# Patient Record
Sex: MALE | Race: OTHER | URBAN - METROPOLITAN AREA
[De-identification: names, ages, dates, MRNs, and addresses within clinical notes are randomized per-mention and may not be internally consistent; named-entity substitution may affect disease eponyms.]

---

## 2023-11-28 ENCOUNTER — OFFICE VISIT (OUTPATIENT)
Age: 48
End: 2023-11-28
Payer: COMMERCIAL

## 2023-11-28 VITALS
SYSTOLIC BLOOD PRESSURE: 142 MMHG | HEART RATE: 76 BPM | DIASTOLIC BLOOD PRESSURE: 96 MMHG | HEIGHT: 72 IN | RESPIRATION RATE: 18 BRPM | WEIGHT: 175 LBS | BODY MASS INDEX: 23.7 KG/M2 | TEMPERATURE: 97.3 F | OXYGEN SATURATION: 97 %

## 2023-11-28 DIAGNOSIS — Z11.4 SCREENING FOR HIV WITHOUT PRESENCE OF RISK FACTORS: ICD-10-CM

## 2023-11-28 DIAGNOSIS — M54.16 LUMBAR RADICULOPATHY, CHRONIC: ICD-10-CM

## 2023-11-28 DIAGNOSIS — M53.9 MULTILEVEL DEGENERATIVE DISC DISEASE: ICD-10-CM

## 2023-11-28 DIAGNOSIS — M1A.09X0 IDIOPATHIC CHRONIC GOUT OF MULTIPLE SITES WITHOUT TOPHUS: ICD-10-CM

## 2023-11-28 DIAGNOSIS — E78.2 MIXED HYPERLIPIDEMIA: ICD-10-CM

## 2023-11-28 DIAGNOSIS — I10 PRIMARY HYPERTENSION: ICD-10-CM

## 2023-11-28 DIAGNOSIS — Z11.59 NEED FOR HEPATITIS C SCREENING TEST: ICD-10-CM

## 2023-11-28 DIAGNOSIS — R39.198 DIFFICULTY URINATING: ICD-10-CM

## 2023-11-28 DIAGNOSIS — Z12.11 SCREENING FOR COLORECTAL CANCER: ICD-10-CM

## 2023-11-28 DIAGNOSIS — M54.12 CERVICAL RADICULOPATHY: Primary | ICD-10-CM

## 2023-11-28 DIAGNOSIS — R35.1 NOCTURIA: ICD-10-CM

## 2023-11-28 DIAGNOSIS — Z12.12 SCREENING FOR COLORECTAL CANCER: ICD-10-CM

## 2023-11-28 LAB
ABSOLUTE IMMATURE GRANULOCYTE: <0.03 K/UL (ref 0–0.58)
ALBUMIN SERPL-MCNC: 4.6 G/DL (ref 3.5–5.2)
ALP BLD-CCNC: 81 U/L (ref 40–129)
ALT SERPL-CCNC: 42 U/L (ref 0–40)
ANION GAP SERPL CALCULATED.3IONS-SCNC: 13 MMOL/L (ref 7–16)
AST SERPL-CCNC: 28 U/L (ref 0–39)
BASOPHILS ABSOLUTE: 0.03 K/UL (ref 0–0.2)
BASOPHILS RELATIVE PERCENT: 1 % (ref 0–2)
BILIRUB SERPL-MCNC: 0.7 MG/DL (ref 0–1.2)
BILIRUBIN, POC: NEGATIVE
BLOOD URINE, POC: NEGATIVE
BUN BLDV-MCNC: 15 MG/DL (ref 6–20)
CALCIUM SERPL-MCNC: 9.4 MG/DL (ref 8.6–10.2)
CHLORIDE BLD-SCNC: 104 MMOL/L (ref 98–107)
CHOLESTEROL: 124 MG/DL
CLARITY, POC: NORMAL
CO2: 27 MMOL/L (ref 22–29)
COLOR, POC: YELLOW
CREAT SERPL-MCNC: 1 MG/DL (ref 0.7–1.2)
EOSINOPHILS ABSOLUTE: 0.27 K/UL (ref 0.05–0.5)
EOSINOPHILS RELATIVE PERCENT: 4 % (ref 0–6)
GFR SERPL CREATININE-BSD FRML MDRD: >60 ML/MIN/1.73M2
GLUCOSE BLD-MCNC: 133 MG/DL (ref 74–99)
GLUCOSE URINE, POC: NEGATIVE
HCT VFR BLD CALC: 44.9 % (ref 37–54)
HDLC SERPL-MCNC: 32 MG/DL
HEMOGLOBIN: 14.7 G/DL (ref 12.5–16.5)
IMMATURE GRANULOCYTES: 0 % (ref 0–5)
KETONES, POC: NEGATIVE
LDL CHOLESTEROL: 68 MG/DL
LEUKOCYTE EST, POC: NEGATIVE
LYMPHOCYTES ABSOLUTE: 2.21 K/UL (ref 1.5–4)
LYMPHOCYTES RELATIVE PERCENT: 34 % (ref 20–42)
MCH RBC QN AUTO: 29.5 PG (ref 26–35)
MCHC RBC AUTO-ENTMCNC: 32.7 G/DL (ref 32–34.5)
MCV RBC AUTO: 90 FL (ref 80–99.9)
MONOCYTES ABSOLUTE: 0.44 K/UL (ref 0.1–0.95)
MONOCYTES RELATIVE PERCENT: 7 % (ref 2–12)
NEUTROPHILS ABSOLUTE: 3.59 K/UL (ref 1.8–7.3)
NEUTROPHILS RELATIVE PERCENT: 55 % (ref 43–80)
NITRITE, POC: NEGATIVE
PDW BLD-RTO: 12.3 % (ref 11.5–15)
PH, POC: 5.5
PLATELET # BLD: 210 K/UL (ref 130–450)
PMV BLD AUTO: 11.2 FL (ref 7–12)
POTASSIUM SERPL-SCNC: 3.5 MMOL/L (ref 3.5–5)
PROSTATE SPECIFIC ANTIGEN: 1.42 NG/ML (ref 0–4)
PROTEIN, POC: NEGATIVE
RBC # BLD: 4.99 M/UL (ref 3.8–5.8)
SODIUM BLD-SCNC: 144 MMOL/L (ref 132–146)
SPECIFIC GRAVITY, POC: 1.03
TOTAL PROTEIN: 7.7 G/DL (ref 6.4–8.3)
TRIGL SERPL-MCNC: 118 MG/DL
UROBILINOGEN, POC: 0.2
VLDLC SERPL CALC-MCNC: 24 MG/DL
WBC # BLD: 6.6 K/UL (ref 4.5–11.5)

## 2023-11-28 PROCEDURE — 3077F SYST BP >= 140 MM HG: CPT | Performed by: FAMILY MEDICINE

## 2023-11-28 PROCEDURE — 3080F DIAST BP >= 90 MM HG: CPT | Performed by: FAMILY MEDICINE

## 2023-11-28 PROCEDURE — 81002 URINALYSIS NONAUTO W/O SCOPE: CPT | Performed by: FAMILY MEDICINE

## 2023-11-28 PROCEDURE — 99204 OFFICE O/P NEW MOD 45 MIN: CPT | Performed by: FAMILY MEDICINE

## 2023-11-28 PROCEDURE — 36415 COLL VENOUS BLD VENIPUNCTURE: CPT | Performed by: FAMILY MEDICINE

## 2023-11-28 RX ORDER — AMLODIPINE BESYLATE 10 MG/1
10 TABLET ORAL DAILY
COMMUNITY
End: 2023-11-28 | Stop reason: SDUPTHER

## 2023-11-28 RX ORDER — ALLOPURINOL 300 MG/1
300 TABLET ORAL DAILY
COMMUNITY

## 2023-11-28 RX ORDER — LISINOPRIL AND HYDROCHLOROTHIAZIDE 25; 20 MG/1; MG/1
1 TABLET ORAL DAILY
COMMUNITY
End: 2023-11-28 | Stop reason: SDUPTHER

## 2023-11-28 RX ORDER — AMLODIPINE BESYLATE 10 MG/1
10 TABLET ORAL DAILY
Qty: 90 TABLET | Refills: 1 | Status: SHIPPED | OUTPATIENT
Start: 2023-11-28

## 2023-11-28 RX ORDER — LISINOPRIL 20 MG/1
20 TABLET ORAL DAILY
COMMUNITY
End: 2023-11-28

## 2023-11-28 RX ORDER — LISINOPRIL AND HYDROCHLOROTHIAZIDE 25; 20 MG/1; MG/1
1 TABLET ORAL DAILY
Qty: 90 TABLET | Refills: 1 | Status: SHIPPED | OUTPATIENT
Start: 2023-11-28

## 2023-11-28 SDOH — ECONOMIC STABILITY: FOOD INSECURITY: WITHIN THE PAST 12 MONTHS, YOU WORRIED THAT YOUR FOOD WOULD RUN OUT BEFORE YOU GOT MONEY TO BUY MORE.: NEVER TRUE

## 2023-11-28 SDOH — ECONOMIC STABILITY: FOOD INSECURITY: WITHIN THE PAST 12 MONTHS, THE FOOD YOU BOUGHT JUST DIDN'T LAST AND YOU DIDN'T HAVE MONEY TO GET MORE.: NEVER TRUE

## 2023-11-28 SDOH — ECONOMIC STABILITY: HOUSING INSECURITY
IN THE LAST 12 MONTHS, WAS THERE A TIME WHEN YOU DID NOT HAVE A STEADY PLACE TO SLEEP OR SLEPT IN A SHELTER (INCLUDING NOW)?: NO

## 2023-11-28 SDOH — ECONOMIC STABILITY: INCOME INSECURITY: HOW HARD IS IT FOR YOU TO PAY FOR THE VERY BASICS LIKE FOOD, HOUSING, MEDICAL CARE, AND HEATING?: NOT VERY HARD

## 2023-11-28 ASSESSMENT — ENCOUNTER SYMPTOMS
NAUSEA: 0
ABDOMINAL PAIN: 0
DIARRHEA: 0
CONSTIPATION: 0
COUGH: 0
VOMITING: 0
SHORTNESS OF BREATH: 0
WHEEZING: 0

## 2023-11-28 ASSESSMENT — PATIENT HEALTH QUESTIONNAIRE - PHQ9
SUM OF ALL RESPONSES TO PHQ QUESTIONS 1-9: 0
2. FEELING DOWN, DEPRESSED OR HOPELESS: 0
SUM OF ALL RESPONSES TO PHQ QUESTIONS 1-9: 0
SUM OF ALL RESPONSES TO PHQ9 QUESTIONS 1 & 2: 0
SUM OF ALL RESPONSES TO PHQ QUESTIONS 1-9: 0
SUM OF ALL RESPONSES TO PHQ QUESTIONS 1-9: 0
1. LITTLE INTEREST OR PLEASURE IN DOING THINGS: 0

## 2023-11-28 NOTE — PROGRESS NOTES
regarding above diagnosis, including possible risks and complications,  especially if left uncontrolled. Counseled regarding the possible side effects, risks, benefits and alternatives to treatment; patient and/or guardian verbalizes understanding, agrees, feels comfortable with and wishes to proceed with above treatment plan. Advised patient to call with any new medication issues, and read all Rx info from pharmacy to assure aware of all possible risks and side effects of medication before taking. Reviewed age and gender appropriate health screening exams and vaccinations. Advised patient regarding importance of keeping up with recommended health maintenance and to schedule as soon as possible if overdue, as this is important in assessing for undiagnosed pathology, especially cancer, as well as protecting against potentially harmful/life threatening disease. Patient and/or guardian verbalizes understanding and agrees with above counseling, assessment and plan. All questions answered    Additional plan and future considerations:   RTO in 6mos    Return to Office: Return in about 6 months (around 5/28/2024).     Electronically signed by Mio Wilkins MD on 11/28/2023 at 11:44 AM

## 2023-11-29 LAB
HEPATITIS C ANTIBODY: NONREACTIVE
HIV AG/AB: NONREACTIVE

## 2024-01-16 ENCOUNTER — PREP FOR PROCEDURE (OUTPATIENT)
Dept: SURGERY | Age: 49
End: 2024-01-16

## 2024-01-16 ENCOUNTER — OFFICE VISIT (OUTPATIENT)
Dept: SURGERY | Age: 49
End: 2024-01-16
Payer: COMMERCIAL

## 2024-01-16 ENCOUNTER — TELEPHONE (OUTPATIENT)
Dept: SURGERY | Age: 49
End: 2024-01-16

## 2024-01-16 VITALS
RESPIRATION RATE: 16 BRPM | OXYGEN SATURATION: 96 % | WEIGHT: 177 LBS | HEIGHT: 72 IN | TEMPERATURE: 97 F | HEART RATE: 71 BPM | DIASTOLIC BLOOD PRESSURE: 100 MMHG | BODY MASS INDEX: 23.98 KG/M2 | SYSTOLIC BLOOD PRESSURE: 150 MMHG

## 2024-01-16 DIAGNOSIS — Z12.11 SCREEN FOR COLON CANCER: ICD-10-CM

## 2024-01-16 DIAGNOSIS — Z12.11 COLON CANCER SCREENING: Primary | ICD-10-CM

## 2024-01-16 PROCEDURE — 99204 OFFICE O/P NEW MOD 45 MIN: CPT | Performed by: SURGERY

## 2024-01-16 PROCEDURE — 99202 OFFICE O/P NEW SF 15 MIN: CPT | Performed by: SURGERY

## 2024-01-16 RX ORDER — SODIUM CHLORIDE 0.9 % (FLUSH) 0.9 %
10 SYRINGE (ML) INJECTION PRN
OUTPATIENT
Start: 2024-01-16

## 2024-01-16 RX ORDER — SODIUM, POTASSIUM,MAG SULFATES 17.5-3.13G
2 SOLUTION, RECONSTITUTED, ORAL ORAL ONCE
Qty: 2 EACH | Refills: 0 | Status: SHIPPED | OUTPATIENT
Start: 2024-01-16 | End: 2024-01-16

## 2024-01-16 RX ORDER — SODIUM CHLORIDE 9 MG/ML
25 INJECTION, SOLUTION INTRAVENOUS PRN
OUTPATIENT
Start: 2024-01-16

## 2024-01-16 RX ORDER — SODIUM CHLORIDE 0.9 % (FLUSH) 0.9 %
10 SYRINGE (ML) INJECTION EVERY 12 HOURS SCHEDULED
OUTPATIENT
Start: 2024-01-16

## 2024-01-16 NOTE — H&P
CC:  Colorectal cancer screening/surveillance    Assessment:  Average risk for colorectal cancer      Plan:  Schedule for colonoscopy with possible biopsy, cauterization, or polypectomy     Visit conducted with video .   Wife accompanied him today.     HPI  Marty Pat presents for colorectal cancer screening.  The patient has never been screened..  The patient reports no abdominal pain, change in stool caliber, blood in stool, rectal bleeding, or weight loss.  There is no family history of colorectal cancer..    Past Medical History:   Diagnosis Date    Gout     Hypertension      Past Surgical History:   Procedure Laterality Date    EYE MUSCLE SURGERY  1983    Had multiple surgeries (5-6)- last 1983.     Family History   Problem Relation Age of Onset    High Cholesterol Mother     Heart Disease Mother     Diabetes Mother     Heart Disease Father     Diabetes Father     Cancer Father 77        Bladder Cancer- Stage 4    Diabetes Brother     Heart Disease Brother     High Cholesterol Brother     Stroke Brother     Diabetes Maternal Grandmother     Cancer Maternal Grandfather      Social History     Tobacco Use    Smoking status: Never    Smokeless tobacco: Never   Vaping Use    Vaping Use: Never used   Substance Use Topics    Alcohol use: Yes    Drug use: Not Currently     Types: Marijuana (Weed)     Prior to Admission medications    Medication Sig Start Date End Date Taking? Authorizing Provider   sodium-potassium-mag sulfate (SUPREP) 17.5-3.13-1.6 GM/177ML SOLN solution Take 2 Bottles by mouth once for 1 dose 1/16/24 1/16/24 Yes Yony Perez MD   allopurinol (ZYLOPRIM) 300 MG tablet Take 1 tablet by mouth daily    ProviderChidi MD   amLODIPine (NORVASC) 10 MG tablet Take 1 tablet by mouth daily 11/28/23   Kera Andrews MD   lisinopril-hydroCHLOROthiazide (PRINZIDE;ZESTORETIC) 20-25 MG per tablet Take 1 tablet by mouth daily 11/28/23   Kera Andrews MD     No Known  unable to communicate to the pt duo to sedation

## 2024-01-16 NOTE — TELEPHONE ENCOUNTER
Scheduled pt for Colonoscopy 24 at 1PM. Pt needs to arrive at [facility] at 12PM. Patient confirmed date and time, address and directions given in office. Prep instructions given in office, patient understood.      Prior Authorization Form:      DEMOGRAPHICS:                     Patient Name:  Juan Pat  Patient :  1975            Insurance:  Payor: [employer] / Plan: [facility] [employer] / Product Type: *No Product type* /   Insurance ID Number:    Payer/Plan Subscr  Sex Relation Sub. Ins. ID Effective Group Num   1. [facility]* JUAN PAT 1975 Male Self [id number]                                   [address]         DIAGNOSIS & PROCEDURE:                       Procedure/Operation: COLONOSCOPY           CPT Code: 55400    Diagnosis:  SCREENING    ICD10 Code: Z12.11    Location:  [facility]    Surgeon:  AYAKA MARQUEZ    SCHEDULING INFORMATION:                          Date: 24    Time: 1PM              Anesthesia:  LMAC                                                       Status:  OUTPATIENT       Special Comments:  N/A       Electronically signed by Laura Guerrero MA on 2024 at 9:58 AM

## 2024-01-16 NOTE — H&P (VIEW-ONLY)
CC:  Colorectal cancer screening/surveillance    Assessment:  Average risk for colorectal cancer      Plan:  Schedule for colonoscopy with possible biopsy, cauterization, or polypectomy     Visit conducted with video .   Wife accompanied him today.     HPI  Marty Pat presents for colorectal cancer screening.  The patient has never been screened..  The patient reports no abdominal pain, change in stool caliber, blood in stool, rectal bleeding, or weight loss.  There is no family history of colorectal cancer..    Past Medical History:   Diagnosis Date    Gout     Hypertension      Past Surgical History:   Procedure Laterality Date    EYE MUSCLE SURGERY  1983    Had multiple surgeries (5-6)- last 1983.     Family History   Problem Relation Age of Onset    High Cholesterol Mother     Heart Disease Mother     Diabetes Mother     Heart Disease Father     Diabetes Father     Cancer Father 77        Bladder Cancer- Stage 4    Diabetes Brother     Heart Disease Brother     High Cholesterol Brother     Stroke Brother     Diabetes Maternal Grandmother     Cancer Maternal Grandfather      Social History     Tobacco Use    Smoking status: Never    Smokeless tobacco: Never   Vaping Use    Vaping Use: Never used   Substance Use Topics    Alcohol use: Yes    Drug use: Not Currently     Types: Marijuana (Weed)     Prior to Admission medications    Medication Sig Start Date End Date Taking? Authorizing Provider   sodium-potassium-mag sulfate (SUPREP) 17.5-3.13-1.6 GM/177ML SOLN solution Take 2 Bottles by mouth once for 1 dose 1/16/24 1/16/24 Yes Yony Perez MD   allopurinol (ZYLOPRIM) 300 MG tablet Take 1 tablet by mouth daily    ProviderChidi MD   amLODIPine (NORVASC) 10 MG tablet Take 1 tablet by mouth daily 11/28/23   Kera Andrews MD   lisinopril-hydroCHLOROthiazide (PRINZIDE;ZESTORETIC) 20-25 MG per tablet Take 1 tablet by mouth daily 11/28/23   Kera Andrews MD     No Known

## 2024-01-25 NOTE — PROGRESS NOTES
[facility]   PRE-ADMISSION TESTING GENERAL INSTRUCTIONS  PAT Phone Number: [phone]      GENERAL INSTRUCTIONS:    [x] Antibacterial Soap Shower Night before and/or AM of surgery.    [x]Follow bowel prep instructions per surgeon.  No liquids/jello that are red or purple color.       [x] Do not wear makeup, lotions, powders, deodorant.   [x] Nothing by mouth after midnight; including gum, candy, mints, or water.  [x] You may brush your teeth, gargle, but do NOT swallow water.   [x] No tobacco products, illegal drugs, marijuana or alcohol within 24 hours of your surgery.  [x] Jewelry or valuables should not be brought to the hospital. All body and/or tongue piercing's must be removed prior to arriving to hospital. No contact lens or hair pins.   [x] Arrange transportation with a responsible adult  to and from the hospital. Arrange for someone to be with you for the remainder of the day and for 24 hours after your procedure due to having had anesthesia.          -Who will be your  for transportation?  wife        -Who will be staying with you for 24 hrs after your procedure?  wife  [x] Bring insurance card and photo ID.       PARKING INSTRUCTIONS:     [x] ARRIVAL DATE  1/31 & TIME   12:00 pm:   [x]Surgery time may change, if it does we will call you the business day before your scheduled surgery. Will call via [employer].  If you do not answer th phone the  will leave you a message.   [x] Enter into the [facility] Entrance. Two adults may accompany you.   [x] Parking lot '\"I\"  is located on [facility] (the corner of [address] and [address]).  To enter the lot,you will need to get a parking ticket at the gate .  To exit you will be given a free parking voucher.  You will need both the ticket and parking voucher to exit the parking lot. One car per patient is allowed to park in this lot.   Walk up the front walk to the [address] Entrance, the

## 2024-01-31 ENCOUNTER — ANESTHESIA EVENT (OUTPATIENT)
Dept: ENDOSCOPY | Age: 49
End: 2024-01-31
Payer: COMMERCIAL

## 2024-01-31 ENCOUNTER — HOSPITAL ENCOUNTER (OUTPATIENT)
Age: 49
Setting detail: OUTPATIENT SURGERY
Discharge: HOME | End: 2024-01-31
Attending: SURGERY | Admitting: SURGERY
Payer: COMMERCIAL

## 2024-01-31 ENCOUNTER — ANESTHESIA (OUTPATIENT)
Dept: ENDOSCOPY | Age: 49
End: 2024-01-31
Payer: COMMERCIAL

## 2024-01-31 VITALS
BODY MASS INDEX: 37.52 KG/M2 | HEART RATE: 76 BPM | DIASTOLIC BLOOD PRESSURE: 91 MMHG | TEMPERATURE: 97 F | SYSTOLIC BLOOD PRESSURE: 155 MMHG | WEIGHT: 277 LBS | OXYGEN SATURATION: 96 % | HEIGHT: 72 IN | RESPIRATION RATE: 16 BRPM

## 2024-01-31 PROBLEM — K57.30 SIGMOID DIVERTICULOSIS: Status: ACTIVE | Noted: 2024-01-31

## 2024-01-31 PROCEDURE — 2500000003 HC RX 250 WO HCPCS: Performed by: NURSE ANESTHETIST, CERTIFIED REGISTERED

## 2024-01-31 PROCEDURE — 2580000003 HC RX 258: Performed by: SURGERY

## 2024-01-31 PROCEDURE — 3700000001 HC ADD 15 MINUTES (ANESTHESIA): Performed by: SURGERY

## 2024-01-31 PROCEDURE — 45378 DIAGNOSTIC COLONOSCOPY: CPT | Performed by: SURGERY

## 2024-01-31 PROCEDURE — 3609027000 HC COLONOSCOPY: Performed by: SURGERY

## 2024-01-31 PROCEDURE — 2580000003 HC RX 258: Performed by: NURSE ANESTHETIST, CERTIFIED REGISTERED

## 2024-01-31 PROCEDURE — 6360000002 HC RX W HCPCS: Performed by: NURSE ANESTHETIST, CERTIFIED REGISTERED

## 2024-01-31 PROCEDURE — 7100000011 HC PHASE II RECOVERY - ADDTL 15 MIN: Performed by: SURGERY

## 2024-01-31 PROCEDURE — 2709999900 HC NON-CHARGEABLE SUPPLY: Performed by: SURGERY

## 2024-01-31 PROCEDURE — 7100000010 HC PHASE II RECOVERY - FIRST 15 MIN: Performed by: SURGERY

## 2024-01-31 PROCEDURE — 3700000000 HC ANESTHESIA ATTENDED CARE: Performed by: SURGERY

## 2024-01-31 RX ORDER — SODIUM CHLORIDE 0.9 % (FLUSH) 0.9 %
10 SYRINGE (ML) INJECTION PRN
Status: DISCONTINUED | OUTPATIENT
Start: 2024-01-31 | End: 2024-01-31 | Stop reason: HOSPADM

## 2024-01-31 RX ORDER — PROPOFOL 10 MG/ML
INJECTION, EMULSION INTRAVENOUS PRN
Status: DISCONTINUED | OUTPATIENT
Start: 2024-01-31 | End: 2024-01-31

## 2024-01-31 RX ORDER — PROPOFOL 10 MG/ML
INJECTION, EMULSION INTRAVENOUS CONTINUOUS PRN
Status: DISCONTINUED | OUTPATIENT
Start: 2024-01-31 | End: 2024-01-31 | Stop reason: SDUPTHER

## 2024-01-31 RX ORDER — SODIUM CHLORIDE 9 MG/ML
25 INJECTION, SOLUTION INTRAVENOUS PRN
Status: DISCONTINUED | OUTPATIENT
Start: 2024-01-31 | End: 2024-01-31 | Stop reason: HOSPADM

## 2024-01-31 RX ORDER — SODIUM CHLORIDE 9 MG/ML
INJECTION, SOLUTION INTRAVENOUS CONTINUOUS PRN
Status: DISCONTINUED | OUTPATIENT
Start: 2024-01-31 | End: 2024-01-31 | Stop reason: SDUPTHER

## 2024-01-31 RX ORDER — MIDAZOLAM HYDROCHLORIDE 1 MG/ML
INJECTION INTRAMUSCULAR; INTRAVENOUS PRN
Status: DISCONTINUED | OUTPATIENT
Start: 2024-01-31 | End: 2024-01-31 | Stop reason: SDUPTHER

## 2024-01-31 RX ORDER — SODIUM CHLORIDE 0.9 % (FLUSH) 0.9 %
10 SYRINGE (ML) INJECTION EVERY 12 HOURS SCHEDULED
Status: DISCONTINUED | OUTPATIENT
Start: 2024-01-31 | End: 2024-01-31 | Stop reason: HOSPADM

## 2024-01-31 RX ORDER — KETAMINE HCL IN NACL, ISO-OSM 100MG/10ML
SYRINGE (ML) INJECTION PRN
Status: DISCONTINUED | OUTPATIENT
Start: 2024-01-31 | End: 2024-01-31 | Stop reason: SDUPTHER

## 2024-01-31 RX ADMIN — SODIUM CHLORIDE 25 ML: 9 INJECTION, SOLUTION INTRAVENOUS at 10:46

## 2024-01-31 RX ADMIN — PROPOFOL 150 MCG/KG/MIN: 10 INJECTION, EMULSION INTRAVENOUS at 13:29

## 2024-01-31 RX ADMIN — MIDAZOLAM 2 MG: 1 INJECTION INTRAMUSCULAR; INTRAVENOUS at 13:29

## 2024-01-31 RX ADMIN — SODIUM CHLORIDE: 9 INJECTION, SOLUTION INTRAVENOUS at 12:57

## 2024-01-31 RX ADMIN — Medication 20 MG: at 13:34

## 2024-01-31 RX ADMIN — Medication 30 MG: at 13:29

## 2024-01-31 ASSESSMENT — ENCOUNTER SYMPTOMS: SHORTNESS OF BREATH: 0

## 2024-01-31 ASSESSMENT — PAIN - FUNCTIONAL ASSESSMENT: PAIN_FUNCTIONAL_ASSESSMENT: 0-10

## 2024-01-31 ASSESSMENT — LIFESTYLE VARIABLES: SMOKING_STATUS: 0

## 2024-01-31 NOTE — ANESTHESIA PRE PROCEDURE
kg (277 lb)   Height: 1.829 m (6') 1.829 m (6')                                              BP Readings from Last 3 Encounters:   01/31/24 (!) 167/95   01/16/24 (!) 150/100   11/28/23 (!) 142/96       NPO Status: Time of last liquid consumption: 2300                        Time of last solid consumption: 1500                        Date of last liquid consumption: 01/30/24                        Date of last solid food consumption: 01/30/24    BMI:   Wt Readings from Last 3 Encounters:   01/31/24 125.6 kg (277 lb)   01/16/24 80.3 kg (177 lb)   11/28/23 79.4 kg (175 lb)     Body mass index is 37.57 kg/m².    CBC:   Lab Results   Component Value Date/Time    WBC 6.6 11/28/2023 10:08 AM    RBC 4.99 11/28/2023 10:08 AM    HGB 14.7 11/28/2023 10:08 AM    HCT 44.9 11/28/2023 10:08 AM    MCV 90.0 11/28/2023 10:08 AM    RDW 12.3 11/28/2023 10:08 AM     11/28/2023 10:08 AM       CMP:   Lab Results   Component Value Date/Time     11/28/2023 10:08 AM    K 3.5 11/28/2023 10:08 AM     11/28/2023 10:08 AM    CO2 27 11/28/2023 10:08 AM    BUN 15 11/28/2023 10:08 AM    CREATININE 1.0 11/28/2023 10:08 AM    LABGLOM >60 11/28/2023 10:08 AM    GLUCOSE 133 11/28/2023 10:08 AM    PROT 7.7 11/28/2023 10:08 AM    CALCIUM 9.4 11/28/2023 10:08 AM    BILITOT 0.7 11/28/2023 10:08 AM    ALKPHOS 81 11/28/2023 10:08 AM    AST 28 11/28/2023 10:08 AM    ALT 42 11/28/2023 10:08 AM       POC Tests: No results for input(s): \"POCGLU\", \"POCNA\", \"POCK\", \"POCCL\", \"POCBUN\", \"POCHEMO\", \"POCHCT\" in the last 72 hours.    Coags: No results found for: \"PROTIME\", \"INR\", \"APTT\"    HCG (If Applicable): No results found for: \"PREGTESTUR\", \"PREGSERUM\", \"HCG\", \"HCGQUANT\"     ABGs: No results found for: \"PHART\", \"PO2ART\", \"RQE0OEX\", \"PBT2XVM\", \"BEART\", \"T2SFSNQP\"     Type & Screen (If Applicable):  No results found for: \"LABABO\", \"LABRH\"    Drug/Infectious Status (If Applicable):  Lab Results   Component Value Date/Time    HEPCAB NONREACTIVE

## 2024-01-31 NOTE — PROGRESS NOTES
Patient tolerating oral intake     Person waiting for patient at bedside    Discharge instructions provided to patient, written and verbal, with significant other at bedside, patient verbalized understanding.     Utilized  Juliette [id number]

## 2024-01-31 NOTE — INTERVAL H&P NOTE
Update History & Physical    The patient's History and Physical of January 16, 2024 was reviewed with the patient and I examined the patient. There was no change. The surgical site was confirmed by the patient and me.     Plan: The risks, benefits, expected outcome, and alternative to the recommended procedure have been discussed with the patient. Patient understands and wants to proceed with the procedure.     Electronically signed by Yony Perez MD on 1/31/2024 at 10:32 AM

## 2024-01-31 NOTE — PROGRESS NOTES
Patient to SRDA & placed on appropriate monitors.   Cart low, locked with siderails up. Call light within reach.   Dr. Bello bedside reviewed post op instructions with patient and wife via

## 2024-02-01 NOTE — ANESTHESIA POSTPROCEDURE EVALUATION
Department of Anesthesiology  Postprocedure Note    Patient: Marty Pat  MRN: [id number]  YOB: 1975  Date of evaluation: 2/1/2024    Procedure Summary       Date: 01/31/24 Room / Location: [place] [address] / [facility]    Anesthesia Start: 1257 Anesthesia Stop: 1353    Procedure: COLORECTAL CANCER SCREENING, NOT HIGH RISK Diagnosis:       Screen for colon cancer      (Screen for colon cancer [Z12.11])    Surgeons: Yony Perez MD Responsible Provider: Kimberlyn Long MD    Anesthesia Type: MAC ASA Status: 2            Anesthesia Type: No value filed.    Mariama Phase I: Mariama Score: 10    Mariama Phase II: Mariama Score: 10    Anesthesia Post Evaluation    Patient location during evaluation: PACU  Patient participation: complete - patient participated  Level of consciousness: awake and alert  Airway patency: patent  Nausea & Vomiting: no nausea and no vomiting  Cardiovascular status: blood pressure returned to baseline and hemodynamically stable  Respiratory status: acceptable and spontaneous ventilation  Hydration status: euvolemic  Multimodal analgesia pain management approach  Pain management: adequate    No notable events documented.

## 2024-02-15 PROBLEM — Z12.11 COLON CANCER SCREENING: Status: RESOLVED | Noted: 2024-01-16 | Resolved: 2024-02-15

## 2024-03-01 PROBLEM — Z12.11 SCREEN FOR COLON CANCER: Status: RESOLVED | Noted: 2024-01-16 | Resolved: 2024-03-01

## 2024-06-05 ENCOUNTER — OFFICE VISIT (OUTPATIENT)
Age: 49
End: 2024-06-05
Payer: COMMERCIAL

## 2024-06-05 VITALS
TEMPERATURE: 97.4 F | HEIGHT: 72 IN | RESPIRATION RATE: 18 BRPM | WEIGHT: 286.5 LBS | SYSTOLIC BLOOD PRESSURE: 122 MMHG | OXYGEN SATURATION: 96 % | BODY MASS INDEX: 38.8 KG/M2 | DIASTOLIC BLOOD PRESSURE: 70 MMHG | HEART RATE: 72 BPM

## 2024-06-05 DIAGNOSIS — M65.4 DE QUERVAIN'S TENOSYNOVITIS, RIGHT: ICD-10-CM

## 2024-06-05 DIAGNOSIS — I10 PRIMARY HYPERTENSION: Primary | ICD-10-CM

## 2024-06-05 DIAGNOSIS — M17.11 PRIMARY OSTEOARTHRITIS OF RIGHT KNEE: ICD-10-CM

## 2024-06-05 PROCEDURE — 3074F SYST BP LT 130 MM HG: CPT | Performed by: FAMILY MEDICINE

## 2024-06-05 PROCEDURE — 99213 OFFICE O/P EST LOW 20 MIN: CPT | Performed by: FAMILY MEDICINE

## 2024-06-05 PROCEDURE — 3078F DIAST BP <80 MM HG: CPT | Performed by: FAMILY MEDICINE

## 2024-06-05 ASSESSMENT — ENCOUNTER SYMPTOMS
COUGH: 0
NAUSEA: 0
DIARRHEA: 0
WHEEZING: 0
VOMITING: 0
CONSTIPATION: 0
SHORTNESS OF BREATH: 0
ABDOMINAL PAIN: 0

## 2024-06-05 ASSESSMENT — PATIENT HEALTH QUESTIONNAIRE - PHQ9
SUM OF ALL RESPONSES TO PHQ QUESTIONS 1-9: 0
SUM OF ALL RESPONSES TO PHQ QUESTIONS 1-9: 0
SUM OF ALL RESPONSES TO PHQ9 QUESTIONS 1 & 2: 0
SUM OF ALL RESPONSES TO PHQ QUESTIONS 1-9: 0
1. LITTLE INTEREST OR PLEASURE IN DOING THINGS: NOT AT ALL
2. FEELING DOWN, DEPRESSED OR HOPELESS: NOT AT ALL
SUM OF ALL RESPONSES TO PHQ QUESTIONS 1-9: 0

## 2024-06-05 NOTE — PROGRESS NOTES
[facility]  DATE OF VISIT : 2024    Patient : Marty Pat   Age : 49 y.o.    : 1975   MRN : [id number]   ______________________________________________________________________    Chief Complaint :   Chief Complaint   Patient presents with    6 Month Follow-Up     Patient is here today for a 6 month follow up today. Blood pressure has been stable. Denies nay headaches, chest pain, or blurred vision. Medications reviewed.    Knee Pain     Right knee pain. Has been wearing a knee brace to be able to work. [facility]  he feel from roof of his house and had fractures to his left ankle.     Wrist Pain     Right wrist. 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        HPI : Marty Pat is 49 y.o. male who presented to the clinic today for OV\.     HTN: Amlodipine 10mg daily, Lisinopril-HCTZ 20-25mg daily. Has been off his medications for over 4mos now.      Right knee pain: Ongoing for years.  Had previously had joint aspiration with [facility] [place] years ago.  Has been doing really good until a couple of months ago his pain restarted sporadically and has been worsening over the past couple of weeks.  Pain is mostly to the medial joint line.    Complaining of left thumb pain that has been ongoing for months.  I reviewed the patient's past medications, allergies and past medical history during this visit.    Past Medical History :    Health Maintenance-   Colonoscopy- 24- repeat in 10yrs      Past Medical History:   Diagnosis Date    Gout     Hypertension      Past Surgical History:   Procedure Laterality Date    COLONOSCOPY N/A 2024    COLORECTAL CANCER SCREENING, NOT HIGH RISK performed by Yony Perez MD at [facility] ENDOSCOPY    EYE MUSCLE SURGERY      Had multiple surgeries (5-6)- last .       Social History :  Social History       Tobacco History       Smoking Status  Never      Smokeless Tobacco Use  Never

## 2024-06-25 ENCOUNTER — PROCEDURE VISIT (OUTPATIENT)
Age: 49
End: 2024-06-25
Payer: COMMERCIAL

## 2024-06-25 VITALS
HEART RATE: 59 BPM | WEIGHT: 285.8 LBS | HEIGHT: 72 IN | TEMPERATURE: 97.2 F | SYSTOLIC BLOOD PRESSURE: 122 MMHG | DIASTOLIC BLOOD PRESSURE: 70 MMHG | BODY MASS INDEX: 38.71 KG/M2 | OXYGEN SATURATION: 97 %

## 2024-06-25 DIAGNOSIS — M17.0 BILATERAL PRIMARY OSTEOARTHRITIS OF KNEE: Primary | ICD-10-CM

## 2024-06-25 PROCEDURE — 20610 DRAIN/INJ JOINT/BURSA W/O US: CPT | Performed by: FAMILY MEDICINE

## 2024-06-25 PROCEDURE — 99999 PR OFFICE/OUTPT VISIT,PROCEDURE ONLY: CPT | Performed by: FAMILY MEDICINE

## 2024-06-25 RX ORDER — TRIAMCINOLONE ACETONIDE 40 MG/ML
40 INJECTION, SUSPENSION INTRA-ARTICULAR; INTRAMUSCULAR ONCE
Status: COMPLETED | OUTPATIENT
Start: 2024-06-25 | End: 2024-06-25

## 2024-06-25 RX ORDER — LIDOCAINE HYDROCHLORIDE 10 MG/ML
4 INJECTION, SOLUTION EPIDURAL; INFILTRATION; INTRACAUDAL; PERINEURAL ONCE
Status: COMPLETED | OUTPATIENT
Start: 2024-06-25 | End: 2024-06-25

## 2024-06-25 RX ADMIN — TRIAMCINOLONE ACETONIDE 40 MG: 40 INJECTION, SUSPENSION INTRA-ARTICULAR; INTRAMUSCULAR at 08:26

## 2024-06-25 RX ADMIN — LIDOCAINE HYDROCHLORIDE 4 ML: 10 INJECTION, SOLUTION EPIDURAL; INFILTRATION; INTRACAUDAL; PERINEURAL at 08:26

## 2024-06-25 NOTE — PROGRESS NOTES
PROCEDURE NOTE:    DIAGNOSIS      right knee pain.     PROCEDURE     right Knee intraarticular injection    PROCEDURAL PAUSE     Procedural pause conducted to verify: ?correct patient identity, procedure to be performed, and as applicable, correct side and site, correct patient position, and availability of implants, special equipment, or special requirements.     PROCEDURE DETAILS     The procedure was carried out under sterile technique.      Patient Position: Sitting with the knee flexed at approximately 90 degrees.     Localization Process: ?The knee joint suprapatellar recess was palpated. The skin was prepped with Betadine and Alcohol.    Approach: Medial to Lateral     Local Anesthesia: Local anesthesia was obtained with vapocoolant cold spray.    Injection/Aspiration: ? A 25-gauge 1 1/2-inch needle was advanced from an in-plane, lateral to medial approach into the knee joint suprapatellar recess. A mixture of 3 cc of 1% lidocaine and 1 cc of Kenalog (40 mg/cc) was injected into the knee joint.     Postprocedure Care: The patient will avoid heavy exertion with the knee and avoid soaking the knee under water for two days. The patient will contact me with any problems related to the injection.     PATIENT EDUCATION     Ready to learn, no apparent learning barriers were identified; learning preferences include listening. Explained diagnosis and treatment plan; patient expressed understanding of the content.     INFORMED CONSENT     Discussed the risks, benefits, alternatives, and the necessity of other members of the healthcare team participating in the procedure. All questions answered and consent given.     Following denial of allergy and review of potential side effects and complications including but not necessarily limited to infection, allergic reaction, local tissue breakdown, aseptic effusion potentially necessitating aspiration and corticosteroid injection, elevation of blood glucose, injury to soft

## 2024-06-28 ENCOUNTER — HOSPITAL ENCOUNTER (OUTPATIENT)
Dept: GENERAL RADIOLOGY | Age: 49
End: 2024-06-28
Payer: COMMERCIAL

## 2024-06-28 ENCOUNTER — HOSPITAL ENCOUNTER (OUTPATIENT)
Dept: MRI IMAGING | Age: 49
Discharge: HOME | End: 2024-06-28
Payer: COMMERCIAL

## 2024-06-28 DIAGNOSIS — M53.9 MULTILEVEL DEGENERATIVE DISC DISEASE: ICD-10-CM

## 2024-06-28 DIAGNOSIS — M54.12 CERVICAL RADICULOPATHY: ICD-10-CM

## 2024-06-28 DIAGNOSIS — M79.5 FOREIGN BODY (FB) IN SOFT TISSUE: ICD-10-CM

## 2024-06-28 DIAGNOSIS — M54.16 LUMBAR RADICULOPATHY, CHRONIC: ICD-10-CM

## 2024-06-28 PROCEDURE — 72141 MRI NECK SPINE W/O DYE: CPT

## 2024-06-28 PROCEDURE — 70030 X-RAY EYE FOR FOREIGN BODY: CPT

## 2024-07-01 ENCOUNTER — TELEPHONE (OUTPATIENT)
Age: 49
End: 2024-07-01

## 2024-07-01 NOTE — TELEPHONE ENCOUNTER
The referrals that the  Sent for the Patient the plan did not cover they want  To called them at this number [phone] and this is the two referral they give me [phone] and [phone]

## 2024-07-18 ENCOUNTER — HOSPITAL ENCOUNTER (OUTPATIENT)
Dept: MRI IMAGING | Age: 49
End: 2024-07-18
Payer: COMMERCIAL

## 2024-07-18 ENCOUNTER — HOSPITAL ENCOUNTER (OUTPATIENT)
Dept: MRI IMAGING | Age: 49
Discharge: HOME | End: 2024-07-20

## 2024-07-18 DIAGNOSIS — M54.12 CERVICAL RADICULOPATHY: ICD-10-CM

## 2024-07-18 DIAGNOSIS — M54.16 LUMBAR RADICULOPATHY, CHRONIC: ICD-10-CM

## 2024-07-18 DIAGNOSIS — M53.9 MULTILEVEL DEGENERATIVE DISC DISEASE: ICD-10-CM

## 2024-07-18 PROCEDURE — 72148 MRI LUMBAR SPINE W/O DYE: CPT

## 2024-07-18 PROCEDURE — 72146 MRI CHEST SPINE W/O DYE: CPT

## 2024-07-24 DIAGNOSIS — M53.9 MULTILEVEL DEGENERATIVE DISC DISEASE: ICD-10-CM

## 2024-07-24 DIAGNOSIS — M54.12 CERVICAL RADICULOPATHY: ICD-10-CM

## 2024-07-24 DIAGNOSIS — M54.16 LUMBAR RADICULOPATHY, CHRONIC: Primary | ICD-10-CM

## 2024-07-24 DIAGNOSIS — M48.00 CENTRAL STENOSIS OF SPINAL CANAL: ICD-10-CM

## 2024-09-05 ENCOUNTER — OFFICE VISIT (OUTPATIENT)
Dept: NEUROSURGERY | Age: 49
End: 2024-09-05
Payer: COMMERCIAL

## 2024-09-05 VITALS
DIASTOLIC BLOOD PRESSURE: 93 MMHG | HEIGHT: 72 IN | SYSTOLIC BLOOD PRESSURE: 180 MMHG | BODY MASS INDEX: 38.22 KG/M2 | OXYGEN SATURATION: 95 % | HEART RATE: 67 BPM | WEIGHT: 282.2 LBS

## 2024-09-05 DIAGNOSIS — M54.50 CHRONIC BILATERAL LOW BACK PAIN WITHOUT SCIATICA: ICD-10-CM

## 2024-09-05 DIAGNOSIS — G89.29 CHRONIC BILATERAL LOW BACK PAIN WITHOUT SCIATICA: ICD-10-CM

## 2024-09-05 DIAGNOSIS — M54.12 CERVICAL RADICULOPATHY: Primary | ICD-10-CM

## 2024-09-05 DIAGNOSIS — M47.816 LUMBAR SPONDYLOSIS: ICD-10-CM

## 2024-09-05 PROCEDURE — 99204 OFFICE O/P NEW MOD 45 MIN: CPT | Performed by: PHYSICIAN ASSISTANT

## 2024-09-05 RX ORDER — MELOXICAM 15 MG/1
15 TABLET ORAL DAILY PRN
Qty: 30 TABLET | Refills: 2 | Status: SHIPPED | OUTPATIENT
Start: 2024-09-05

## 2024-09-05 ASSESSMENT — ENCOUNTER SYMPTOMS
TROUBLE SWALLOWING: 0
PHOTOPHOBIA: 0
ABDOMINAL PAIN: 0
BACK PAIN: 1
SHORTNESS OF BREATH: 0

## 2024-09-05 NOTE — PROGRESS NOTES
[facility]      Subjective:  Marty Pat is a 49 year old male presenting to the office today as a new patient c/o neck pain with radiation down his left arm and low back pain. Describes the pain as aching, burning, and shooting. Admits to numbness/tingling in his left arm and in both legs. Daily activities makes the symptoms worse. Patient was referred for PT by his PCP but he has not gone yet. Denies recent ANDREW. Denies loss of bowel or bladder, saddle anesthesia, pain down the legs, headache, loss of dexterity, abnormal gait, fever, chills, N/V, SOB, or chest pain.    Patient is Czech speaking and  was used the entire encounter.     Review of Systems   Constitutional:  Negative for fever and unexpected weight change.   HENT:  Negative for trouble swallowing.    Eyes:  Negative for photophobia and visual disturbance.   Respiratory:  Negative for shortness of breath.    Cardiovascular:  Negative for chest pain.   Gastrointestinal:  Negative for abdominal pain.   Endocrine: Negative for heat intolerance.   Genitourinary:  Negative for flank pain.   Musculoskeletal:  Positive for arthralgias, back pain, neck pain and neck stiffness. Negative for gait problem and myalgias.   Skin:  Negative for wound.   Neurological:  Positive for weakness and numbness. Negative for headaches.   Psychiatric/Behavioral:  Negative for confusion.        Objective:  Vitals:    09/05/24 1108   BP: (!) 180/93   Pulse: 67   SpO2: 95%       Physical Exam  Constitutional:       Appearance: Normal appearance. He is well-developed.   HENT:      Head: Normocephalic and atraumatic.   Eyes:      Extraocular Movements: Extraocular movements intact.      Conjunctiva/sclera: Conjunctivae normal.      Pupils: Pupils are equal, round, and reactive to light.   Cardiovascular:      Rate and Rhythm: Normal rate.   Pulmonary:      Effort: Pulmonary effort is normal.   Abdominal:      General: There

## 2024-09-07 ENCOUNTER — APPOINTMENT (OUTPATIENT)
Dept: GENERAL RADIOLOGY | Age: 49
End: 2024-09-07
Payer: COMMERCIAL

## 2024-09-07 ENCOUNTER — HOSPITAL ENCOUNTER (EMERGENCY)
Age: 49
Discharge: HOME | End: 2024-09-07
Payer: COMMERCIAL

## 2024-09-07 VITALS
DIASTOLIC BLOOD PRESSURE: 91 MMHG | RESPIRATION RATE: 16 BRPM | SYSTOLIC BLOOD PRESSURE: 167 MMHG | HEART RATE: 92 BPM | TEMPERATURE: 97.8 F | OXYGEN SATURATION: 95 %

## 2024-09-07 DIAGNOSIS — M19.019 OSTEOARTHRITIS OF ACROMIOCLAVICULAR JOINT: Primary | ICD-10-CM

## 2024-09-07 DIAGNOSIS — M54.12 CERVICAL RADICULOPATHY: ICD-10-CM

## 2024-09-07 PROCEDURE — 99284 EMERGENCY DEPT VISIT MOD MDM: CPT

## 2024-09-07 PROCEDURE — 6370000000 HC RX 637 (ALT 250 FOR IP): Performed by: NURSE PRACTITIONER

## 2024-09-07 PROCEDURE — 6360000002 HC RX W HCPCS: Performed by: NURSE PRACTITIONER

## 2024-09-07 PROCEDURE — 96372 THER/PROPH/DIAG INJ SC/IM: CPT

## 2024-09-07 PROCEDURE — 73030 X-RAY EXAM OF SHOULDER: CPT

## 2024-09-07 RX ORDER — CYCLOBENZAPRINE HCL 10 MG
10 TABLET ORAL ONCE
Status: COMPLETED | OUTPATIENT
Start: 2024-09-07 | End: 2024-09-07

## 2024-09-07 RX ORDER — PREDNISONE 20 MG/1
40 TABLET ORAL ONCE
Status: COMPLETED | OUTPATIENT
Start: 2024-09-07 | End: 2024-09-07

## 2024-09-07 RX ORDER — METHYLPREDNISOLONE 4 MG
TABLET, DOSE PACK ORAL
Qty: 1 KIT | Refills: 0 | Status: SHIPPED | OUTPATIENT
Start: 2024-09-07 | End: 2024-09-13

## 2024-09-07 RX ORDER — HYDROCODONE BITARTRATE AND ACETAMINOPHEN 5; 325 MG/1; MG/1
1 TABLET ORAL ONCE
Status: COMPLETED | OUTPATIENT
Start: 2024-09-07 | End: 2024-09-07

## 2024-09-07 RX ORDER — KETOROLAC TROMETHAMINE 30 MG/ML
30 INJECTION, SOLUTION INTRAMUSCULAR; INTRAVENOUS ONCE
Status: COMPLETED | OUTPATIENT
Start: 2024-09-07 | End: 2024-09-07

## 2024-09-07 RX ADMIN — CYCLOBENZAPRINE 10 MG: 10 TABLET, FILM COATED ORAL at 12:21

## 2024-09-07 RX ADMIN — HYDROCODONE BITARTRATE AND ACETAMINOPHEN 1 TABLET: 5; 325 TABLET ORAL at 12:21

## 2024-09-07 RX ADMIN — PREDNISONE 40 MG: 20 TABLET ORAL at 12:21

## 2024-09-07 RX ADMIN — KETOROLAC TROMETHAMINE 30 MG: 30 INJECTION, SOLUTION INTRAMUSCULAR at 12:21

## 2024-09-07 ASSESSMENT — PAIN SCALES - GENERAL: PAINLEVEL_OUTOF10: 6

## 2024-09-07 ASSESSMENT — PAIN DESCRIPTION - ORIENTATION: ORIENTATION: LEFT

## 2024-09-07 ASSESSMENT — PAIN DESCRIPTION - DESCRIPTORS: DESCRIPTORS: ACHING;BURNING;CRAMPING

## 2024-09-07 ASSESSMENT — LIFESTYLE VARIABLES
HOW MANY STANDARD DRINKS CONTAINING ALCOHOL DO YOU HAVE ON A TYPICAL DAY: 1 OR 2
HOW OFTEN DO YOU HAVE A DRINK CONTAINING ALCOHOL: MONTHLY OR LESS

## 2024-09-07 ASSESSMENT — PAIN DESCRIPTION - LOCATION: LOCATION: SHOULDER

## 2024-09-07 NOTE — ED PROVIDER NOTES
[facility] EMERGENCY DEPARTMENT  ED  Encounter Note  Admit Date/RoomTime: 9/7/2024  1:04 PM  ED Room: CHAIR01/  Independent SHANTE Visit.    HPI:  9/7/24,   Time: 12:09 PM EDT         Marty Pat is a 49 y.o. male presenting to the ED for left shoulder pain, beginning few weeks ago.  The complaint has been persistent, moderate in severity, and worsened by nothing.   # [id number],  iPad was used for complaints of left sided shoulder pain.  No falls no injuries.  Was recently just seen by neurosurgery for cervical radiculopathy.  He is taken ibuprofen 800 mg for pain last dose was yesterday.  He denies any chest pain or shortness of breath.  He has no visible swelling or deformity.    ROS:   Pertinent positives and negatives are stated within HPI, all other systems reviewed and are negative.  --------------------------------------------- PAST HISTORY ---------------------------------------------  Past Medical History:  has a past medical history of Gout and Hypertension.    Past Surgical History:  has a past surgical history that includes eye muscle surgery (1983) and Colonoscopy (N/A, 1/31/2024).    Social History:  reports that he has never smoked. He has never used smokeless tobacco. He reports that he does not currently use alcohol. He reports that he does not currently use drugs after having used the following drugs: Marijuana (Weed).    Family History: family history includes Cancer in his maternal grandfather; Cancer (age of onset: 77) in his father; Diabetes in his brother, father, maternal grandmother, and mother; Heart Disease in his brother, father, and mother; High Cholesterol in his brother and mother; Stroke in his brother.     The patient’s home medications have been reviewed.    Allergies: Patient has no known allergies.    -------------------------------------------------- RESULTS -------------------------------------------------  All laboratory and  (TORADOL) injection 30 mg (30 mg IntraMUSCular Given 9/7/24 1221)   cyclobenzaprine (FLEXERIL) tablet 10 mg (10 mg Oral Given 9/7/24 1221)   HYDROcodone-acetaminophen (NORCO) 5-325 MG per tablet 1 tablet (1 tablet Oral Given 9/7/24 1221)   predniSONE (DELTASONE) tablet 40 mg (40 mg Oral Given 9/7/24 1221)         Medical Decision Making:  presenting to the ED for left shoulder pain, beginning few weeks ago.  The complaint has been persistent, moderate in severity, and worsened by nothing.   # [id number],  iPad was used for complaints of left sided shoulder pain.  No falls no injuries.  Was recently just seen by neurosurgery for cervical radiculopathy.  He is taken ibuprofen 800 mg for pain last dose was yesterday.  He denies any chest pain or shortness of breath.  He has no visible swelling or deformity.     [id number]  iPad called and informed of negative x-ray findings no acute etiology.  Osteoarthritis noted.  MRI was reviewed from June 28, which showed moderate central canal stenosis at C5-C7.  Plan will be for discharge to home anti-inflammatory medications and a steroid pack.  Advised to follow-up with PCP    Counseling:   The emergency provider has spoken with the patient and discussed today’s results, in addition to providing specific details for the plan of care and counseling regarding the diagnosis and prognosis.  Questions are answered at this time and they are agreeable with the plan.      --------------------------------- IMPRESSION AND DISPOSITION ---------------------------------    IMPRESSION  1. Osteoarthritis of acromioclavicular joint    2. Cervical radiculopathy        DISPOSITION  Disposition: Discharge to home  Patient condition is good                 Kimberlyn Berry, APRN - CNP  09/07/24 1998

## 2024-10-21 DIAGNOSIS — I10 PRIMARY HYPERTENSION: ICD-10-CM

## 2024-10-21 NOTE — TELEPHONE ENCOUNTER
Name of Medication(s) Requested:  Requested Prescriptions     Pending Prescriptions Disp Refills    lisinopril-hydroCHLOROthiazide (PRINZIDE;ZESTORETIC) 20-25 MG per tablet 90 tablet 1     Sig: Take 1 tablet by mouth daily    amLODIPine (NORVASC) 10 MG tablet 90 tablet 1     Sig: Take 1 tablet by mouth daily       Medication is on current medication list Yes    Dosage and directions were verified? Yes    Quantity verified: 90 day supply     Pharmacy Verified?  Yes    Last Appointment:  6/25/2024    Future appts:  Future Appointments   Date Time Provider Department Center   12/5/2024  8:30 AM Kera Andrews MD [facility] DEP        (If no appt send self scheduling link. .REFILLAPPT)  Scheduling request sent?     [x] Yes  [] No    Does patient need updated?  [] Yes  [x] No

## 2024-10-22 RX ORDER — LISINOPRIL AND HYDROCHLOROTHIAZIDE 20; 25 MG/1; MG/1
1 TABLET ORAL DAILY
Qty: 90 TABLET | Refills: 1 | Status: SHIPPED | OUTPATIENT
Start: 2024-10-22

## 2024-10-22 RX ORDER — AMLODIPINE BESYLATE 10 MG/1
10 TABLET ORAL DAILY
Qty: 90 TABLET | Refills: 1 | Status: SHIPPED | OUTPATIENT
Start: 2024-10-22

## 2024-12-05 ENCOUNTER — OFFICE VISIT (OUTPATIENT)
Age: 49
End: 2024-12-05

## 2024-12-05 VITALS
HEART RATE: 65 BPM | DIASTOLIC BLOOD PRESSURE: 72 MMHG | RESPIRATION RATE: 16 BRPM | SYSTOLIC BLOOD PRESSURE: 134 MMHG | WEIGHT: 277.9 LBS | TEMPERATURE: 97.7 F | HEIGHT: 72 IN | BODY MASS INDEX: 37.64 KG/M2 | OXYGEN SATURATION: 98 %

## 2024-12-05 DIAGNOSIS — G47.30 SLEEP APNEA, UNSPECIFIED TYPE: ICD-10-CM

## 2024-12-05 DIAGNOSIS — I10 PRIMARY HYPERTENSION: ICD-10-CM

## 2024-12-05 DIAGNOSIS — R06.83 LOUD SNORING: ICD-10-CM

## 2024-12-05 DIAGNOSIS — M54.50 CHRONIC BILATERAL LOW BACK PAIN WITHOUT SCIATICA: ICD-10-CM

## 2024-12-05 DIAGNOSIS — G89.29 CHRONIC BILATERAL LOW BACK PAIN WITHOUT SCIATICA: ICD-10-CM

## 2024-12-05 DIAGNOSIS — B35.1 ONYCHOMYCOSIS: ICD-10-CM

## 2024-12-05 DIAGNOSIS — M47.816 LUMBAR SPONDYLOSIS: ICD-10-CM

## 2024-12-05 DIAGNOSIS — M54.12 CERVICAL RADICULOPATHY: Primary | ICD-10-CM

## 2024-12-05 DIAGNOSIS — M53.9 MULTILEVEL DEGENERATIVE DISC DISEASE: ICD-10-CM

## 2024-12-05 RX ORDER — MELOXICAM 15 MG/1
15 TABLET ORAL DAILY PRN
Qty: 30 TABLET | Refills: 1 | Status: SHIPPED | OUTPATIENT
Start: 2024-12-05

## 2024-12-05 RX ORDER — AMLODIPINE BESYLATE 10 MG/1
10 TABLET ORAL DAILY
Qty: 90 EACH | Refills: 1 | Status: SHIPPED | OUTPATIENT
Start: 2024-12-05

## 2024-12-05 RX ORDER — TERBINAFINE HYDROCHLORIDE 250 MG/1
250 TABLET ORAL DAILY
Qty: 84 TABLET | Refills: 0 | Status: SHIPPED | OUTPATIENT
Start: 2024-12-05 | End: 2025-02-27

## 2024-12-05 RX ORDER — GABAPENTIN 100 MG/1
100 CAPSULE ORAL 2 TIMES DAILY
Qty: 180 CAPSULE | Refills: 1 | Status: SHIPPED | OUTPATIENT
Start: 2024-12-05 | End: 2025-06-03

## 2024-12-05 RX ORDER — LISINOPRIL AND HYDROCHLOROTHIAZIDE 20; 25 MG/1; MG/1
1 TABLET ORAL DAILY
Qty: 90 TABLET | Refills: 1 | Status: SHIPPED | OUTPATIENT
Start: 2024-12-05

## 2024-12-05 SDOH — ECONOMIC STABILITY: FOOD INSECURITY: WITHIN THE PAST 12 MONTHS, YOU WORRIED THAT YOUR FOOD WOULD RUN OUT BEFORE YOU GOT MONEY TO BUY MORE.: NEVER TRUE

## 2024-12-05 SDOH — ECONOMIC STABILITY: FOOD INSECURITY: WITHIN THE PAST 12 MONTHS, THE FOOD YOU BOUGHT JUST DIDN'T LAST AND YOU DIDN'T HAVE MONEY TO GET MORE.: NEVER TRUE

## 2024-12-05 SDOH — ECONOMIC STABILITY: INCOME INSECURITY: HOW HARD IS IT FOR YOU TO PAY FOR THE VERY BASICS LIKE FOOD, HOUSING, MEDICAL CARE, AND HEATING?: NOT HARD AT ALL

## 2024-12-05 ASSESSMENT — ENCOUNTER SYMPTOMS
WHEEZING: 0
VOMITING: 0
SHORTNESS OF BREATH: 0
COUGH: 0
CONSTIPATION: 0
NAUSEA: 0
ABDOMINAL PAIN: 0
DIARRHEA: 0

## 2024-12-05 NOTE — PROGRESS NOTES
Negative for dizziness, light-headedness and headaches.     ______________________________________________________________________    Physical Exam :    Vitals: /72 (Site: Left Upper Arm, Position: Sitting, Cuff Size: Large Adult)   Pulse 65   Temp 97.7 °F (36.5 °C) (Temporal)   Resp 16   Ht 1.829 m (6' 0.01\")   Wt 126.1 kg (277 lb 14.4 oz)   SpO2 98%   BMI 37.68 kg/m²   Physical Exam  Vitals reviewed.   Constitutional:       General: He is not in acute distress.     Appearance: Normal appearance.   Cardiovascular:      Rate and Rhythm: Normal rate and regular rhythm.      Pulses: Normal pulses.      Heart sounds: Normal heart sounds. No murmur heard.  Pulmonary:      Effort: Pulmonary effort is normal. No respiratory distress.      Breath sounds: Normal breath sounds. No wheezing.   Abdominal:      General: Bowel sounds are normal. There is no distension.      Palpations: Abdomen is soft.      Tenderness: There is no abdominal tenderness.   Musculoskeletal:      Right lower leg: No edema.      Left lower leg: No edema.      Comments: Discolored, thick nails on bilateral hands.   Neurological:      Mental Status: He is alert.       ___________________    Assessment & Plan :    1. Primary hypertension  -Well-controlled  -Continue amlodipine 10 mg daily and Prinzide 20-25 mg daily  -Continue BP monitoring at home  - amLODIPine (NORVASC) 10 MG tablet; Take 1 tablet by mouth daily  Dispense: 90 each; Refill: 1  - lisinopril-hydroCHLOROthiazide (PRINZIDE;ZESTORETIC) 20-25 MG per tablet; Take 1 tablet by mouth daily  Dispense: 90 tablet; Refill: 1  - Comprehensive Metabolic Panel; Future    2. Lumbar spondylosis  3. Chronic bilateral low back pain without sciatica  4. Cervical radiculopathy  5. Multilevel degenerative disc disease  -Had extensive discussion with patient regarding conservative management including but not limited to PT/OT and pain management  -Trial gabapentin 100 mg, will start with 100 mg

## 2024-12-27 DIAGNOSIS — M53.9 MULTILEVEL DEGENERATIVE DISC DISEASE: ICD-10-CM

## 2024-12-27 DIAGNOSIS — I10 PRIMARY HYPERTENSION: ICD-10-CM

## 2024-12-27 DIAGNOSIS — M54.12 CERVICAL RADICULOPATHY: ICD-10-CM

## 2024-12-27 DIAGNOSIS — M54.50 CHRONIC BILATERAL LOW BACK PAIN WITHOUT SCIATICA: ICD-10-CM

## 2024-12-27 DIAGNOSIS — M47.816 LUMBAR SPONDYLOSIS: ICD-10-CM

## 2024-12-27 DIAGNOSIS — G89.29 CHRONIC BILATERAL LOW BACK PAIN WITHOUT SCIATICA: ICD-10-CM

## 2024-12-30 RX ORDER — GABAPENTIN 100 MG/1
100 CAPSULE ORAL 2 TIMES DAILY
Qty: 180 CAPSULE | Refills: 1 | Status: SHIPPED | OUTPATIENT
Start: 2024-12-30 | End: 2025-06-28

## 2024-12-30 RX ORDER — MELOXICAM 15 MG/1
15 TABLET ORAL DAILY PRN
Qty: 30 TABLET | Refills: 1 | Status: SHIPPED | OUTPATIENT
Start: 2024-12-30

## 2024-12-30 RX ORDER — LISINOPRIL AND HYDROCHLOROTHIAZIDE 20; 25 MG/1; MG/1
1 TABLET ORAL DAILY
Qty: 90 TABLET | Refills: 1 | Status: SHIPPED | OUTPATIENT
Start: 2024-12-30

## 2025-01-06 ENCOUNTER — LAB (OUTPATIENT)
Dept: FAMILY MEDICINE CLINIC | Age: 50
End: 2025-01-06

## 2025-01-06 DIAGNOSIS — I10 PRIMARY HYPERTENSION: ICD-10-CM

## 2025-01-06 DIAGNOSIS — B35.1 ONYCHOMYCOSIS: ICD-10-CM

## 2025-01-06 LAB
ALBUMIN: 4.5 G/DL (ref 3.5–5.2)
ALP BLD-CCNC: 77 U/L (ref 40–129)
ALT SERPL-CCNC: 35 U/L (ref 0–40)
ANION GAP SERPL CALCULATED.3IONS-SCNC: 9 MMOL/L (ref 7–16)
AST SERPL-CCNC: 21 U/L (ref 0–39)
BILIRUB SERPL-MCNC: 0.7 MG/DL (ref 0–1.2)
BUN BLDV-MCNC: 21 MG/DL (ref 6–20)
CALCIUM SERPL-MCNC: 9.8 MG/DL (ref 8.6–10.2)
CHLORIDE BLD-SCNC: 102 MMOL/L (ref 98–107)
CO2: 30 MMOL/L (ref 22–29)
CREAT SERPL-MCNC: 0.8 MG/DL (ref 0.7–1.2)
GFR, ESTIMATED: >90 ML/MIN/1.73M2
GLUCOSE BLD-MCNC: 123 MG/DL (ref 74–99)
POTASSIUM SERPL-SCNC: 4.1 MMOL/L (ref 3.5–5)
SODIUM BLD-SCNC: 141 MMOL/L (ref 132–146)
TOTAL PROTEIN: 7.4 G/DL (ref 6.4–8.3)

## 2025-06-09 ENCOUNTER — TELEPHONE (OUTPATIENT)
Age: 50
End: 2025-06-09

## (undated) DEVICE — GAUZE,SPONGE,4"X4",8PLY,STRL,LF,10/TRAY: Brand: MEDLINE

## (undated) DEVICE — DEFENDO AIR WATER SUCTION AND BIOPSY VALVE KIT FOR  OLYMPUS: Brand: DEFENDO AIR/WATER/SUCTION AND BIOPSY VALVE

## (undated) DEVICE — CONNECTOR IRRIGATION AUXILIARY H2O JET W/ PRT MTL THRD HYDR